# Patient Record
Sex: MALE | Race: WHITE | NOT HISPANIC OR LATINO | URBAN - METROPOLITAN AREA
[De-identification: names, ages, dates, MRNs, and addresses within clinical notes are randomized per-mention and may not be internally consistent; named-entity substitution may affect disease eponyms.]

---

## 2023-01-04 NOTE — ASU PATIENT PROFILE, ADULT - NS PREOP UNDERSTANDS INFO
No food/dairy/candy/gum after 9:30pm tonight, water before 04:30am tomorrow; Patient will bring photo ID/Insurance card; no jewelries/contact lens; no smoking/alcohol drinking/drug use; dress in comfortable clothes; escort must show photo ID. Hospital address and phone number was given./yes

## 2023-01-04 NOTE — PRE PROCEDURE NOTE - PRE PROCEDURE EVALUATION
PRE ADMISSION NOTE    Diagnosis:  Chronic Severe Tonsillitis    Planned Surgical Procedures:     Bilateral Tonsillectomy  Indications for surgery and specialty history    21 yo male complains of chronic sore throats for over 2 years.  he has had at least 5 episodes this past year and at least 4 the year before. He has had many positive strep cultures by a variety of doctors and treated with at least 6 antibiotics over the last year according to the patient.  He has taken amoxil, augmentin, ceftin, and zithromax. Multiple ENT doctors recommend tonsillectomy  PE bilateral tonsils inflammed and enlarged with exudate    Medical History:  see above    Medications:  recurrent antibiotics     Allergies  none known    Intolerances; none    Special Comments:    Post op first visit given date and time: one week    Patient called the evening before surgery: yes    Patient given all postop instructions and medications in advance of surgery:   yes with meds given about 5 days ago    Consent in chart      Discussed all risks, benefits, complications, problems, realistic expectations, chance of recurrence, possible need for further surgery, need for patient follow-up, postop course etc discussed and fully understood.        All questions answered

## 2023-01-05 ENCOUNTER — OUTPATIENT (OUTPATIENT)
Dept: OUTPATIENT SERVICES | Facility: HOSPITAL | Age: 23
LOS: 1 days | Discharge: HOME | End: 2023-01-05
Payer: COMMERCIAL

## 2023-01-05 VITALS
RESPIRATION RATE: 16 BRPM | HEART RATE: 75 BPM | DIASTOLIC BLOOD PRESSURE: 69 MMHG | SYSTOLIC BLOOD PRESSURE: 116 MMHG | OXYGEN SATURATION: 97 %

## 2023-01-05 VITALS
TEMPERATURE: 98 F | DIASTOLIC BLOOD PRESSURE: 70 MMHG | HEIGHT: 69 IN | HEART RATE: 73 BPM | OXYGEN SATURATION: 98 % | SYSTOLIC BLOOD PRESSURE: 120 MMHG | RESPIRATION RATE: 14 BRPM | WEIGHT: 151.46 LBS

## 2023-01-05 DIAGNOSIS — Z98.890 OTHER SPECIFIED POSTPROCEDURAL STATES: Chronic | ICD-10-CM

## 2023-01-05 PROCEDURE — 88189 FLOWCYTOMETRY/READ 16 & >: CPT

## 2023-01-05 PROCEDURE — 88304 TISSUE EXAM BY PATHOLOGIST: CPT | Mod: 26

## 2023-01-05 RX ORDER — APREPITANT 80 MG/1
40 CAPSULE ORAL ONCE
Refills: 0 | Status: COMPLETED | OUTPATIENT
Start: 2023-01-05 | End: 2023-01-05

## 2023-01-05 RX ORDER — SODIUM CHLORIDE 9 MG/ML
1000 INJECTION, SOLUTION INTRAVENOUS
Refills: 0 | Status: DISCONTINUED | OUTPATIENT
Start: 2023-01-05 | End: 2023-01-05

## 2023-01-05 RX ORDER — ONDANSETRON 8 MG/1
4 TABLET, FILM COATED ORAL ONCE
Refills: 0 | Status: DISCONTINUED | OUTPATIENT
Start: 2023-01-05 | End: 2023-01-05

## 2023-01-05 RX ORDER — ACETAMINOPHEN 500 MG
1000 TABLET ORAL ONCE
Refills: 0 | Status: COMPLETED | OUTPATIENT
Start: 2023-01-05 | End: 2023-01-05

## 2023-01-05 RX ORDER — FENTANYL CITRATE 50 UG/ML
25 INJECTION INTRAVENOUS
Refills: 0 | Status: DISCONTINUED | OUTPATIENT
Start: 2023-01-05 | End: 2023-01-05

## 2023-01-05 RX ADMIN — APREPITANT 40 MILLIGRAM(S): 80 CAPSULE ORAL at 06:15

## 2023-01-05 RX ADMIN — Medication 1000 MILLIGRAM(S): at 06:15

## 2023-01-05 NOTE — PROGRESS NOTE ADULT - SUBJECTIVE AND OBJECTIVE BOX
Post op Check in Recovery Room  S/P SMR-CT guided FESS and Turb reduction  Complaints : none  Vital Signs: VSS  mouth  adn  throat :dry and clean  Lungs: clear  Dressing : dry  Stable  Instructions  Meds already given yesterday to patient  RTO 1 week: patient  has time and date at my office

## 2023-01-05 NOTE — ASU DISCHARGE PLAN (ADULT/PEDIATRIC) - CARE PROVIDER_API CALL
Juancho Calvillo)  Otolaryngology  [address] [place] [address]  Phone: [phone]  Fax: [phone]  Established Patient  Scheduled Appointment: 01/12/2023 02:00 PM

## 2023-01-05 NOTE — ASU DISCHARGE PLAN (ADULT/PEDIATRIC) - NS MD DC FALL RISK RISK
For information on Fall & Injury Prevention, visit: [email or web address] OR  [email or web address] OR  https://www.cdc.gov/steadi/patient.html

## 2023-01-05 NOTE — ASU DISCHARGE PLAN (ADULT/PEDIATRIC) - D. IF YOU HAD ANY TYPE OF SEDATION, YOU MAY EXPERIENCE LIGHTHEADEDNESS, DIZZINESS, OR SLEEPINESS FOLLOWING YOUR PROCEDURE. A RESPONSIBLE ADULT SHOULD STAY WITH YOU FOR AT LEAST 24 HOURS FOLLOWING YOUR PROCEDURE.
Addended by: JERZY CRUZ on: 8/12/2020 11:10 AM     Modules accepted: Bea Newell     Statement Selected

## 2023-01-05 NOTE — ASU DISCHARGE PLAN (ADULT/PEDIATRIC) - PROVIDER TOKENS
PROVIDER:[TOKEN:[[id number]],SCHEDULEDAPPT:[01/12/2023],SCHEDULEDAPPTTIME:[02:00 PM],ESTABLISHEDPATIENT:[T]]

## 2023-01-05 NOTE — PRE-ANESTHESIA EVALUATION ADULT - NSANTHOSAYNRD_GEN_A_CORE
No. SAMIR screening performed.  STOP BANG Legend: 0-2 = LOW Risk; 3-4 = INTERMEDIATE Risk; 5-8 = HIGH Risk

## 2023-01-11 LAB — SURGICAL PATHOLOGY STUDY: SIGNIFICANT CHANGE UP

## 2023-01-16 VITALS
DIASTOLIC BLOOD PRESSURE: 95 MMHG | HEART RATE: 119 BPM | HEIGHT: 69 IN | OXYGEN SATURATION: 98 % | SYSTOLIC BLOOD PRESSURE: 131 MMHG | WEIGHT: 147.93 LBS | RESPIRATION RATE: 18 BRPM

## 2023-01-16 PROCEDURE — 99285 EMERGENCY DEPT VISIT HI MDM: CPT

## 2023-01-16 RX ORDER — ONDANSETRON 8 MG/1
4 TABLET, FILM COATED ORAL ONCE
Refills: 0 | Status: COMPLETED | OUTPATIENT
Start: 2023-01-16 | End: 2023-01-16

## 2023-01-16 RX ADMIN — ONDANSETRON 4 MILLIGRAM(S): 8 TABLET, FILM COATED ORAL at 23:25

## 2023-01-17 ENCOUNTER — INPATIENT (INPATIENT)
Facility: HOSPITAL | Age: 23
LOS: 0 days | Discharge: HOME HEALTH | DRG: 909 | End: 2023-01-17
Attending: OTOLARYNGOLOGY | Admitting: OTOLARYNGOLOGY
Payer: COMMERCIAL

## 2023-01-17 VITALS
SYSTOLIC BLOOD PRESSURE: 99 MMHG | TEMPERATURE: 98 F | RESPIRATION RATE: 17 BRPM | HEART RATE: 69 BPM | OXYGEN SATURATION: 97 % | DIASTOLIC BLOOD PRESSURE: 67 MMHG

## 2023-01-17 DIAGNOSIS — Z98.890 OTHER SPECIFIED POSTPROCEDURAL STATES: Chronic | ICD-10-CM

## 2023-01-17 PROBLEM — Z78.9 OTHER SPECIFIED HEALTH STATUS: Chronic | Status: ACTIVE | Noted: 2023-01-05

## 2023-01-17 LAB
ANION GAP SERPL CALC-SCNC: 7 MMOL/L — SIGNIFICANT CHANGE UP (ref 5–17)
APTT BLD: 28.7 SEC — SIGNIFICANT CHANGE UP (ref 27.5–35.5)
BASOPHILS # BLD AUTO: 0.03 K/UL — SIGNIFICANT CHANGE UP (ref 0–0.2)
BASOPHILS NFR BLD AUTO: 0.2 % — SIGNIFICANT CHANGE UP (ref 0–2)
BLD GP AB SCN SERPL QL: NEGATIVE — SIGNIFICANT CHANGE UP
BUN SERPL-MCNC: 17 MG/DL — SIGNIFICANT CHANGE UP (ref 7–23)
CALCIUM SERPL-MCNC: 8.8 MG/DL — SIGNIFICANT CHANGE UP (ref 8.4–10.5)
CHLORIDE SERPL-SCNC: 101 MMOL/L — SIGNIFICANT CHANGE UP (ref 96–108)
CO2 SERPL-SCNC: 31 MMOL/L — SIGNIFICANT CHANGE UP (ref 22–31)
CREAT SERPL-MCNC: 0.92 MG/DL — SIGNIFICANT CHANGE UP (ref 0.5–1.3)
EGFR: 121 ML/MIN/1.73M2 — SIGNIFICANT CHANGE UP
EOSINOPHIL # BLD AUTO: 0.14 K/UL — SIGNIFICANT CHANGE UP (ref 0–0.5)
EOSINOPHIL NFR BLD AUTO: 1.1 % — SIGNIFICANT CHANGE UP (ref 0–6)
GLUCOSE SERPL-MCNC: 104 MG/DL — HIGH (ref 70–99)
HCT VFR BLD CALC: 42.4 % — SIGNIFICANT CHANGE UP (ref 39–50)
HGB BLD-MCNC: 14.7 G/DL — SIGNIFICANT CHANGE UP (ref 13–17)
IMM GRANULOCYTES NFR BLD AUTO: 0.7 % — SIGNIFICANT CHANGE UP (ref 0–0.9)
INR BLD: 1.23 — HIGH (ref 0.88–1.16)
LYMPHOCYTES # BLD AUTO: 24.8 % — SIGNIFICANT CHANGE UP (ref 13–44)
LYMPHOCYTES # BLD AUTO: 3.19 K/UL — SIGNIFICANT CHANGE UP (ref 1–3.3)
MCHC RBC-ENTMCNC: 30.6 PG — SIGNIFICANT CHANGE UP (ref 27–34)
MCHC RBC-ENTMCNC: 34.7 GM/DL — SIGNIFICANT CHANGE UP (ref 32–36)
MCV RBC AUTO: 88.1 FL — SIGNIFICANT CHANGE UP (ref 80–100)
MONOCYTES # BLD AUTO: 1.38 K/UL — HIGH (ref 0–0.9)
MONOCYTES NFR BLD AUTO: 10.7 % — SIGNIFICANT CHANGE UP (ref 2–14)
NEUTROPHILS # BLD AUTO: 8.04 K/UL — HIGH (ref 1.8–7.4)
NEUTROPHILS NFR BLD AUTO: 62.5 % — SIGNIFICANT CHANGE UP (ref 43–77)
NRBC # BLD: 0 /100 WBCS — SIGNIFICANT CHANGE UP (ref 0–0)
PLATELET # BLD AUTO: 306 K/UL — SIGNIFICANT CHANGE UP (ref 150–400)
POTASSIUM SERPL-MCNC: 4.2 MMOL/L — SIGNIFICANT CHANGE UP (ref 3.5–5.3)
POTASSIUM SERPL-SCNC: 4.2 MMOL/L — SIGNIFICANT CHANGE UP (ref 3.5–5.3)
PROTHROM AB SERPL-ACNC: 14.7 SEC — HIGH (ref 10.5–13.4)
RBC # BLD: 4.81 M/UL — SIGNIFICANT CHANGE UP (ref 4.2–5.8)
RBC # FLD: 11.6 % — SIGNIFICANT CHANGE UP (ref 10.3–14.5)
RH IG SCN BLD-IMP: POSITIVE — SIGNIFICANT CHANGE UP
SARS-COV-2 RNA SPEC QL NAA+PROBE: NEGATIVE — SIGNIFICANT CHANGE UP
SODIUM SERPL-SCNC: 139 MMOL/L — SIGNIFICANT CHANGE UP (ref 135–145)
WBC # BLD: 12.87 K/UL — HIGH (ref 3.8–10.5)
WBC # FLD AUTO: 12.87 K/UL — HIGH (ref 3.8–10.5)

## 2023-01-17 PROCEDURE — 80048 BASIC METABOLIC PNL TOTAL CA: CPT

## 2023-01-17 PROCEDURE — 85025 COMPLETE CBC W/AUTO DIFF WBC: CPT

## 2023-01-17 PROCEDURE — 96374 THER/PROPH/DIAG INJ IV PUSH: CPT

## 2023-01-17 PROCEDURE — 86901 BLOOD TYPING SEROLOGIC RH(D): CPT

## 2023-01-17 PROCEDURE — 87635 SARS-COV-2 COVID-19 AMP PRB: CPT

## 2023-01-17 PROCEDURE — 36415 COLL VENOUS BLD VENIPUNCTURE: CPT

## 2023-01-17 PROCEDURE — 85610 PROTHROMBIN TIME: CPT

## 2023-01-17 PROCEDURE — 86900 BLOOD TYPING SEROLOGIC ABO: CPT

## 2023-01-17 PROCEDURE — 85730 THROMBOPLASTIN TIME PARTIAL: CPT

## 2023-01-17 PROCEDURE — 86850 RBC ANTIBODY SCREEN: CPT

## 2023-01-17 PROCEDURE — 99285 EMERGENCY DEPT VISIT HI MDM: CPT

## 2023-01-17 RX ORDER — ACETAMINOPHEN 500 MG
20 TABLET ORAL
Qty: 560 | Refills: 0
Start: 2023-01-17 | End: 2023-01-23

## 2023-01-17 RX ORDER — ACETAMINOPHEN 500 MG
650 TABLET ORAL EVERY 6 HOURS
Refills: 0 | Status: DISCONTINUED | OUTPATIENT
Start: 2023-01-17 | End: 2023-01-17

## 2023-01-17 RX ORDER — ACETAMINOPHEN WITH CODEINE 300MG-30MG
15 TABLET ORAL
Qty: 360 | Refills: 0
Start: 2023-01-17 | End: 2023-01-20

## 2023-01-17 RX ORDER — SODIUM CHLORIDE 9 MG/ML
1000 INJECTION, SOLUTION INTRAVENOUS
Refills: 0 | Status: DISCONTINUED | OUTPATIENT
Start: 2023-01-17 | End: 2023-01-17

## 2023-01-17 RX ORDER — ONDANSETRON 8 MG/1
1 TABLET, FILM COATED ORAL
Qty: 6 | Refills: 0
Start: 2023-01-17 | End: 2023-01-18

## 2023-01-17 RX ORDER — OXYCODONE HYDROCHLORIDE 5 MG/1
5 TABLET ORAL EVERY 6 HOURS
Refills: 0 | Status: DISCONTINUED | OUTPATIENT
Start: 2023-01-17 | End: 2023-01-17

## 2023-01-17 RX ORDER — OXYCODONE HYDROCHLORIDE 5 MG/1
5 TABLET ORAL
Qty: 60 | Refills: 0
Start: 2023-01-17 | End: 2023-01-19

## 2023-01-17 RX ADMIN — SODIUM CHLORIDE 100 MILLILITER(S): 9 INJECTION, SOLUTION INTRAVENOUS at 02:51

## 2023-01-17 RX ADMIN — Medication 650 MILLIGRAM(S): at 08:07

## 2023-01-17 NOTE — CONSULT NOTE ADULT - SUBJECTIVE AND OBJECTIVE BOX
OTOLARYNGOLOGY (ENT) CONSULTATION NOTE    PATIENT: VALERIANO LYNN     MRN: [id number]       : [birth year]  DATE OF ADMISSION:23  DATE OF SERVICE:  23 @ 01:07    CHIEF COMPLAINT: Post-tonsillectomy bleed    HISTORY OF PRESENT ILLNESS:  VALERIANO LYNN is a 22y Male history of chronic tonsillitis s/p tonsillectomy with Dr. Calvillo on 2023 who presents with complaints of bleeding from the tonsillar bed. Patient states that he was eating a muffin around 9:00PM on 23 when we began to bleed from his throat; states the bleeding was brisk with bright red blood. Bleeding continued for about 30 minutes while he made his way to the ED. Estimates about  teaspoons of blood. States he was swallowing blood, but does not think he aspirated any. Notes two previous episodes of bleeds from oropharynx over last few days; however, both were brief, not brisk, and resolved with ice chips. Last ate a muffin at 9:00PM on 23.     Denies previous medical history     PAST MEDICAL HISTORY:  No pertinent past medical history        CURRENT MEDICATIONS       HOME MEDICATIONS:      ALLERGIES:  No Known Allergies    SOCIAL HISTORY: Pertinent included in HPI   FAMILY HISTORY      SURGICAL HISTORY:  History of surgery        REVIEW OF SYSTEMS: The patient was asked and responded to a review of systems regarding the following symptoms: constitutional, eye, ears, nose, mouth, throat, cardiovascular, respiratory. Pertinent factors have been included in the HPI.     PHYSICAL EXAMINATION:    Vital Signs:  T(C): 36.7 (23 @ 00:35), Max: 36.7 (23 @ 00:35)  HR: 67 (23 @ 00:35) (67 - 119)  BP: 123/72 (23 @ 00:35) (123/72 - 131/95)  RR: 18 (23 @ 00:35) (18 - 18)  SpO2: 98% (23 @ 00:35) (98% - 98%)    General: NAD, A+Ox3  No respiratory distress, stridor, or stertor  Voice quality: normal  Face: Symmetric without masses or lesions  OU: PERRL, EOMI  AD: Pinna wnl, EAC clear, TM intact, no effusion  AS: Pinna wnl, EAC clear, TM intact, no effusion  Nose: nasal cavity clear bilaterally, inferior turbinates normal, mucosa normal without crusting or bleeding  OC/OP: tongue normal, floor of mouth wnl; large, recent clot in left inferior pole of tonsilar bed; no obvious bleeding in right tonsillar bed; bright, red blood in posterior pharynx  Neck: soft/flat, no LAD  CNII-XII intact                 14.7   12.87 )-----------( 306      ( 2023 23:25 )             42.4    01-16    139  |  101  |  17  ----------------------------<  104<H>  4.2   |  31  |  0.92    Ca    8.8      2023 23:25     PT/INR - ( 2023 23:25 )   PT: 14.7 sec;   INR: 1.23          PTT - ( 2023 23:25 )  PTT:28.7 [id number]        -------------------------------

## 2023-01-17 NOTE — ASU DISCHARGE PLAN (ADULT/PEDIATRIC) - CALL YOUR DOCTOR IF YOU HAVE ANY OF THE FOLLOWING:
Inability to eat, drink, stay hydrated; bleeding from mouth/throat; any other symptoms that are concerning to you/Bleeding that does not stop/Pain not relieved by Medications/Fever greater than (need to indicate Fahrenheit or Celsius)

## 2023-01-17 NOTE — PRE-ANESTHESIA EVALUATION ADULT - NSANTHPMHFT_GEN_ALL_CORE
S/p tonsillectomy 10 days ago per pt S/p tonsillectomy 10 days ago per pt    No PMH  No home meds  NKDA

## 2023-01-17 NOTE — ED PROVIDER NOTE - PHYSICAL EXAMINATION
General: Awake, alert and oriented. No acute distress. Well developed, hydrated and nourished. Appears stated age.   Skin: Skin in warm, dry and intact without rashes or lesions. Appropriate color for ethnicity  HENMT: head normocephalic and atraumatic; bilateral external ears without swelling. no nasal discharge. moist oral mucosa. supple neck, trachea midline, no large amout of hemoorhagic bleeding from either tonsil noted. large clot noted on left tonsillar area with minimal oozing. patient speaking in full sentences without distredd  EYES: Conjunctiva clear. nonicteric sclera. EOM intact, Eyelids are normal in appearance without swelling or lesions.  Cardiac: well perfused  Respiratory: breathing comfortably on room air. no audible wheezing or stridor  Abdominal: nondistended  MSK: Neck and back are without deformity, visible external skin changes, or signs of trauma. Curvature of the cervical, thoracic, and lumbar spine are within normal limits. no external signs of trauma. no apparent deficits in ROM of any extremity  Neurological: The patient is awake, alert and oriented to person, place, and time with normal speech. CN 2-12 grossly intact. no apparent deficits. Memory is normal and thought process is intact. No gait abnormalities are appreciated.   Psychiatric: Appropriate mood and affect. Good judgement and insight

## 2023-01-17 NOTE — CONSULT NOTE ADULT - NSCONSULTADDITIONALINFOA_GEN_ALL_CORE
The patient was seen 4 days ago and his tonsils were healing well and no clots.    Seen and left inferior tonsil pole huge clot where he bled  no bleeding history  consent from patient  spoke with both parents in [place]   all risks, options, complications, realistic expectations and postop course etc discussed and fully understood

## 2023-01-17 NOTE — ED PROVIDER NOTE - OBJECTIVE STATEMENT
22m no pmhx s/p tonsillectomy on 1/5 presenting for bleeding from his tonsillectomy site. was in usual state of health until 1/5 hours prior to ed presentation when he started bleeding. bleeding has slowed since it first started. not on anticoagulation. no sob. feels mildly nauseated.

## 2023-01-17 NOTE — H&P ADULT - HISTORY OF PRESENT ILLNESS
HPI: VALERIANO LYNN is a 22y Male history of chronic tonsillitis s/p tonsillectomy with Dr. Calvillo on 01/05/2023 who presents with complaints of bleeding from the tonsillar bed. Patient states that he was eating a muffin around 9:00PM on 01/16/23 when we began to bleed from his throat; states the bleeding was brisk with bright red blood. Bleeding continued for about 30 minutes while he made his way to the ED. Estimates about  teaspoons of blood. States he was swallowing blood, but does not think he aspirated any. Notes two previous episodes of bleeds from oropharynx over last few days; however, both were brief, not brisk, and resolved with ice chips. Last ate a muffin at 9:00PM on 1/16/23.     Patient is now s/p control of post-tonsillectomy bleed in OR on 1/17/2023. Large clot found on left inferior pole; areas of oozing from throughout bilateral tonsillar beds. No intraoperative complications.

## 2023-01-17 NOTE — BRIEF OPERATIVE NOTE - NSICDXBRIEFPROCEDURE_GEN_ALL_CORE_FT
PROCEDURES:  Control of hemorrhage, postoperative, tonsillar bed 17-Jan-2023 02:38:22  Carmen Theodore

## 2023-01-17 NOTE — ASU DISCHARGE PLAN (ADULT/PEDIATRIC) - CARE PROVIDER_API CALL
Juancho Calvillo)  Otolaryngology  [address] [place] [address]  Phone: [phone]  Fax: [phone]  Established Patient  Follow Up Time:

## 2023-01-17 NOTE — BRIEF OPERATIVE NOTE - OPERATION/FINDINGS
Control of post-operative tonsilar bleed; large clot in inferior pole of left tonsil; generalized oozing from throughout tonsillar beds bilaterally; significant oozing from anterior pole of left tonsillar bed

## 2023-01-17 NOTE — CONSULT NOTE ADULT - ASSESSMENT
ASSESSMENT/PLAN:    IMPRESSION: VALERIANO LYNN is a 22y Male with hx chronic tonsillitis s/p tonsillectomy on 01/05/2023 presenting to ED with post-tonsillectomy hemorrhage described as brisk, red blood from oropharynx, which had resolved to large clot of left inferior pole on exam. Plan for urgent OR for control of post-tonsillectomy bleed    RECOMMENDATIONS:  - OR for control of post-tonsillectomy bleed  - NPO   - Pre-operative labs + COVID  - Admit patient overnight after OR  - Plan to send home with liquid Tylenol-Codeine  - Discussed with Dr. Calvillo

## 2023-01-17 NOTE — H&P ADULT - ASSESSMENT
VALERIANO LYNN is a 22y Male with hx chronic tonsillitis s/p tonsillectomy on 01/05/2023 who presented to ED with post-tonsillectomy hemorrhage described as brisk, red blood from oropharynx, which had resolved to large clot of left inferior pole on exam; now s/p return to OR for control of post-tonsillectomy hemorrhage. Recovering in PACU appropriately.     - Admit for overnight observation   - Continue maintenance fluids  - Pain control   - Soft diet  - Plan for discharge tomorrow with liquid Tylenol-Codeine  - Discussed with attending, Dr. Calvillo

## 2023-01-17 NOTE — H&P ADULT - NSHPPHYSICALEXAM_GEN_ALL_CORE
General: Waking up from anesthesia  No respiratory distress, stridor, or stertor  Voice quality: normal  Face: Symmetric without masses or lesions  OU: PERRL, EOMI  AD: Pinna wnl, EAC clear, TM intact, no effusion  AS: Pinna wnl, EAC clear, TM intact, no effusion  Nose: nasal cavity clear bilaterally, inferior turbinates normal, mucosa normal without crusting or bleeding  OC/OP: tongue normal, floor of mouth wnl; tonsillar beds without bleeding, expected post-operative changes from cauterization   Neck: soft/flat, no LAD  CNII-XII intact

## 2023-01-17 NOTE — ASU DISCHARGE PLAN (ADULT/PEDIATRIC) - ASU DC SPECIAL INSTRUCTIONSFT
- Please eat soft diet for next two weeks  - Avoid anything with blood thinning properties (i.e. marijuana, CBD oil, Excedrin, aspirin, etc)   - Call Dr. Calvillo for follow up

## 2023-01-17 NOTE — ED PROVIDER NOTE - NS ED MD TWO NIGHTS YN
Subjective:       Patient ID: Marky Patel is a 64 y.o. male.    Chief Complaint: Abdominal Pain    Patient is new to me.  He presents today for evaluation of several issues.  First he is complaining of excessive fatigue for approximately 2 months.  He states that he typically rides his bike about 100 miles per week.  He states that his fatigue has caused him to reduce his bike riding.  He has not had shortness of breath or chest pain.  He does report some difficulty sleeping throughout the night.  He does not nap during the daytime.  Patient is also complaining of left-sided abdominal pain.  Pain radiates to the groin and testicle.  He was seen by urology and had normal ultrasound and normal urinalysis.  He was treated with a course of antibiotics and symptoms did not resolve.  He denies urinary symptoms or bowel changes.  Patient is up-to-date with routine screening colonoscopy.  He does have a history of 2 1st degree relatives diagnosed with colon cancer prior to age 50.  Patients patient medical/surgical, social and family histories have been reviewed       Abdominal Pain  This is a new problem. The current episode started more than 1 month ago. The onset quality is undetermined. The problem occurs daily. The most recent episode lasted 1 hours. The pain is located in the LLQ. The pain is at a severity of 3/10. The pain is mild. The quality of the pain is dull. The abdominal pain radiates to the LLQ. Associated symptoms include belching, diarrhea, dysuria and frequency. Pertinent negatives include no anorexia, arthralgias, constipation, fever, flatus, headaches, hematochezia, melena, myalgias, nausea, vomiting or weight loss. Nothing aggravates the pain. The pain is relieved by nothing. He has tried antibiotics for the symptoms. The treatment provided no relief. His past medical history is significant for abdominal surgery, gallstones and GERD. There is no history of colon cancer, Crohn's disease, irritable  bowel syndrome, pancreatitis, PUD or ulcerative colitis. Patient's medical history includes UTI. Patient's medical history does not include kidney stones.     Review of Systems   Constitutional: Positive for fatigue. Negative for activity change, appetite change, fever and weight loss.   Respiratory: Negative for cough and shortness of breath.    Cardiovascular: Negative for chest pain, palpitations and leg swelling.   Gastrointestinal: Positive for abdominal pain and diarrhea. Negative for abdominal distention, anal bleeding, anorexia, constipation, flatus, hematochezia, melena, nausea and vomiting.   Genitourinary: Positive for dysuria, frequency and testicular pain. Negative for difficulty urinating, scrotal swelling and urgency.   Musculoskeletal: Negative for arthralgias and myalgias.   Skin: Negative for rash.   Neurological: Negative for headaches.       Objective:      Physical Exam  Vitals signs reviewed.   Constitutional:       General: He is not in acute distress.     Appearance: Normal appearance. He is well-developed.   HENT:      Head: Normocephalic and atraumatic.   Eyes:      Conjunctiva/sclera: Conjunctivae normal.   Cardiovascular:      Rate and Rhythm: Normal rate and regular rhythm.      Heart sounds: Normal heart sounds.   Pulmonary:      Effort: Pulmonary effort is normal.      Breath sounds: Normal breath sounds.   Abdominal:      General: Bowel sounds are normal.      Palpations: Abdomen is soft. Abdomen is not rigid.      Tenderness: There is no abdominal tenderness. There is no guarding or rebound. Negative signs include Angelo's sign and McBurney's sign.   Lymphadenopathy:      Head:      Right side of head: No submental, submandibular, tonsillar, posterior auricular or occipital adenopathy.      Left side of head: No submental, submandibular, tonsillar, posterior auricular or occipital adenopathy.      Cervical: Cervical adenopathy present.      Right cervical: Superficial cervical  adenopathy present.      Upper Body:      Right upper body: No supraclavicular adenopathy.      Left upper body: No supraclavicular adenopathy.   Skin:     General: Skin is warm and dry.      Capillary Refill: Capillary refill takes less than 2 seconds.      Comments: Few scattered scaling papules and erythematous/flesh toned papules on hands and forearms    Neurological:      Mental Status: He is alert.         Assessment:       1. LAD (lymphadenopathy) of right cervical region    2. Fatigue, unspecified type    3. Healthcare maintenance    4. Left inguinal pain    5. Left lower quadrant abdominal pain        Plan:       Marky was seen today for abdominal pain.    Diagnoses and all orders for this visit:    LAD (lymphadenopathy) of right cervical region  -     US Soft Tissue Head Neck Thyroid; Future  -     HIV 1/2 Ag/Ab (4th Gen); Future    Fatigue, unspecified type  -     CBC Auto Differential; Future  -     Comprehensive Metabolic Panel; Future  -     TSH; Future  -     T4, Free; Future  -     Testosterone; Future  -     HIV 1/2 Ag/Ab (4th Gen); Future    Healthcare maintenance  -     Lipid Panel; Future  -     PSA, Screening; Future  -     Hepatitis C Antibody; Future  -     HIV 1/2 Ag/Ab (4th Gen); Future    Left inguinal pain  -     Urinalysis; Future  -     CT Abdomen Pelvis  Without Contrast; Future    Left lower quadrant abdominal pain  -     CT Abdomen Pelvis  Without Contrast; Future           Follow up for estab pcp in 4-8 weeks, lab US and CT soon .      Yes

## 2023-01-17 NOTE — ED PROVIDER NOTE - CLINICAL SUMMARY MEDICAL DECISION MAKING FREE TEXT BOX
tachcyardic, no hypotension. no signifncant bleeding in ED. will obtain cbc, type and screen, presurgical labs. case d/w ent as bleeding is not signifncnat no intervntion at this time. ent will come to ed to eval. zofran given for nausea likely 2/2 swallowed bleeding and to prevent vomiting that can cause clot dislodgement. will avoid intrvention until ed arrival as patient now with stable bleeding and risks of clot dislodgement outweigh potential therapeutic benefits.

## 2023-01-17 NOTE — ASU DISCHARGE PLAN (ADULT/PEDIATRIC) - NS MD DC FALL RISK RISK
For information on Fall & Injury Prevention, visit: [email or web address] OR  [email or web address] OR  https://www.cdc.gov/steadi/patient.html

## 2023-01-17 NOTE — DISCHARGE NOTE NURSING/CASE MANAGEMENT/SOCIAL WORK - PATIENT PORTAL LINK FT
You can access the FollowMyHealth Patient Portal offered by [facility] by registering at the following website: [email or web address]. By joining [employer]’s FollowMyHealth portal, you will also be able to view your health information using other applications (apps) compatible with our system.

## 2023-01-23 DIAGNOSIS — Z20.822 CONTACT WITH AND (SUSPECTED) EXPOSURE TO COVID-19: ICD-10-CM

## 2023-01-23 DIAGNOSIS — J95.831 POSTPROCEDURAL HEMORRHAGE OF A RESPIRATORY SYSTEM ORGAN OR STRUCTURE FOLLOWING OTHER PROCEDURE: ICD-10-CM
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
## 2023-01-23 DIAGNOSIS — J95.830 POSTPROCEDURAL HEMORRHAGE OF A RESPIRATORY SYSTEM ORGAN OR STRUCTURE FOLLOWING A RESPIRATORY SYSTEM PROCEDURE: ICD-10-CM

## 2024-10-22 ENCOUNTER — RX ONLY (RX ONLY)
Age: 24
End: 2024-10-22

## 2024-10-22 ENCOUNTER — OFFICE (OUTPATIENT)
Dept: URBAN - METROPOLITAN AREA CLINIC 29 | Facility: CLINIC | Age: 24
Setting detail: OPHTHALMOLOGY
End: 2024-10-22
Payer: COMMERCIAL

## 2024-10-22 DIAGNOSIS — H50.52: ICD-10-CM

## 2024-10-22 DIAGNOSIS — H16.223: ICD-10-CM

## 2024-10-22 PROBLEM — H52.13 MYOPIA; BOTH EYES: Status: ACTIVE | Noted: 2024-10-22

## 2024-10-22 PROCEDURE — 92004 COMPRE OPH EXAM NEW PT 1/>: CPT | Performed by: OPTOMETRIST

## 2024-10-22 ASSESSMENT — TONOMETRY
OS_IOP_MMHG: 11
OD_IOP_MMHG: 11

## 2024-10-22 ASSESSMENT — VISUAL ACUITY
OD_BCVA: 20/20-1
OS_BCVA: 20/20-2

## 2024-10-22 ASSESSMENT — REFRACTION_MANIFEST
OD_VA1: 20/20
OD_VA1: 20/20
OS_SPHERE: -1.75
OD_CYLINDER: SPHERE
OD_CYLINDER: S
OS_CYLINDER: S
OD_SPHERE: -1.75
OS_SPHERE: -1.00
OD_SPHERE: -1.00
OS_VA1: 20/20
OS_CYLINDER: SPHERE
OS_VA1: 20/20

## 2024-10-22 ASSESSMENT — REFRACTION_AUTOREFRACTION
OS_SPHERE: -2.00
OD_AXIS: 005
OS_CYLINDER: +0.25
OD_CYLINDER: +0.25
OD_SPHERE: -1.75
OS_AXIS: 025

## 2024-10-22 ASSESSMENT — CONFRONTATIONAL VISUAL FIELD TEST (CVF)
OS_FINDINGS: FULL
OD_FINDINGS: FULL

## 2024-10-22 ASSESSMENT — REFRACTION_CURRENTRX
OS_CYLINDER: SPH
OD_CYLINDER: SPH
OS_SPHERE: -1.50
OS_OVR_VA: 20/
OD_OVR_VA: 20/
OD_SPHERE: -1.50

## 2024-10-22 ASSESSMENT — TEAR BREAK UP TIME (TBUT)
OS_TBUT: 1+
OD_TBUT: 1+

## (undated) DEVICE — GLV 6.5 PROTEXIS (WHITE)

## (undated) DEVICE — POSITIONER FOAM HEAD DONUT 9" (PINK)

## (undated) DEVICE — ELCTR BOVIE SUCTION 8FR 6"

## (undated) DEVICE — PACK UPPER BODY

## (undated) DEVICE — DRAPE BACK TABLE COVER 80X90"

## (undated) DEVICE — WARMING BLANKET LOWER ADULT

## (undated) DEVICE — VENODYNE/SCD SLEEVE CALF MEDIUM

## (undated) DEVICE — ELCTR BOVIE TIP BLADE INSULATED 2.75" EDGE

## (undated) DEVICE — SOL ANTI FOG

## (undated) DEVICE — TUBING SUCTION 20FT

## (undated) DEVICE — SYR LUER LOK 20CC

## (undated) DEVICE — SLV COMPRESSION KNEE MED

## (undated) DEVICE — Device

## (undated) DEVICE — MARKING PEN W RULER

## (undated) DEVICE — CATH IV SAFE INSYTE 16G X 1.16" (GRAY)

## (undated) DEVICE — POSITIONER FOAM EGG CRATE ULNAR 2PCS (PINK)

## (undated) DEVICE — LUBRICATING JELLY ONESHOT 1.25OZ

## (undated) DEVICE — ELCTR COAGULATOR HANDSWITCHING 10FR

## (undated) DEVICE — ELCTR COLORADO 3CM

## (undated) DEVICE — RAYTEC SPONGE 4X4 16PLY

## (undated) DEVICE — PACK TONSIL ADENOID